# Patient Record
Sex: MALE | URBAN - METROPOLITAN AREA
[De-identification: names, ages, dates, MRNs, and addresses within clinical notes are randomized per-mention and may not be internally consistent; named-entity substitution may affect disease eponyms.]

---

## 2022-08-08 PROBLEM — Z00.00 ENCOUNTER FOR PREVENTIVE HEALTH EXAMINATION: Status: ACTIVE | Noted: 2022-08-08

## 2022-08-24 ENCOUNTER — INPATIENT (INPATIENT)
Facility: HOSPITAL | Age: 62
LOS: 0 days | Discharge: HOME | End: 2022-08-25
Attending: PSYCHIATRY & NEUROLOGY | Admitting: PSYCHIATRY & NEUROLOGY

## 2022-08-24 ENCOUNTER — TRANSCRIPTION ENCOUNTER (OUTPATIENT)
Age: 62
End: 2022-08-24

## 2022-08-24 VITALS
HEART RATE: 68 BPM | OXYGEN SATURATION: 98 % | DIASTOLIC BLOOD PRESSURE: 82 MMHG | RESPIRATION RATE: 19 BRPM | WEIGHT: 240.08 LBS | SYSTOLIC BLOOD PRESSURE: 154 MMHG | TEMPERATURE: 98 F

## 2022-08-24 LAB
ALBUMIN SERPL ELPH-MCNC: 4.4 G/DL — SIGNIFICANT CHANGE UP (ref 3.5–5.2)
ALP SERPL-CCNC: 65 U/L — SIGNIFICANT CHANGE UP (ref 30–115)
ALT FLD-CCNC: 9 U/L — SIGNIFICANT CHANGE UP (ref 0–41)
ANION GAP SERPL CALC-SCNC: 11 MMOL/L — SIGNIFICANT CHANGE UP (ref 7–14)
APTT BLD: 30 SEC — SIGNIFICANT CHANGE UP (ref 27–39.2)
AST SERPL-CCNC: 11 U/L — SIGNIFICANT CHANGE UP (ref 0–41)
BASOPHILS # BLD AUTO: 0.04 K/UL — SIGNIFICANT CHANGE UP (ref 0–0.2)
BASOPHILS NFR BLD AUTO: 0.4 % — SIGNIFICANT CHANGE UP (ref 0–1)
BILIRUB SERPL-MCNC: 0.3 MG/DL — SIGNIFICANT CHANGE UP (ref 0.2–1.2)
BUN SERPL-MCNC: 29 MG/DL — HIGH (ref 10–20)
CALCIUM SERPL-MCNC: 9.5 MG/DL — SIGNIFICANT CHANGE UP (ref 8.5–10.1)
CHLORIDE SERPL-SCNC: 107 MMOL/L — SIGNIFICANT CHANGE UP (ref 98–110)
CO2 SERPL-SCNC: 26 MMOL/L — SIGNIFICANT CHANGE UP (ref 17–32)
CREAT SERPL-MCNC: 1.1 MG/DL — SIGNIFICANT CHANGE UP (ref 0.7–1.5)
EGFR: 76 ML/MIN/1.73M2 — SIGNIFICANT CHANGE UP
EOSINOPHIL # BLD AUTO: 0.09 K/UL — SIGNIFICANT CHANGE UP (ref 0–0.7)
EOSINOPHIL NFR BLD AUTO: 0.9 % — SIGNIFICANT CHANGE UP (ref 0–8)
GLUCOSE SERPL-MCNC: 116 MG/DL — HIGH (ref 70–99)
HCT VFR BLD CALC: 46.1 % — SIGNIFICANT CHANGE UP (ref 42–52)
HGB BLD-MCNC: 15.1 G/DL — SIGNIFICANT CHANGE UP (ref 14–18)
IMM GRANULOCYTES NFR BLD AUTO: 0.4 % — HIGH (ref 0.1–0.3)
INR BLD: 0.96 RATIO — SIGNIFICANT CHANGE UP (ref 0.65–1.3)
LYMPHOCYTES # BLD AUTO: 1.23 K/UL — SIGNIFICANT CHANGE UP (ref 1.2–3.4)
LYMPHOCYTES # BLD AUTO: 12.3 % — LOW (ref 20.5–51.1)
MCHC RBC-ENTMCNC: 25.8 PG — LOW (ref 27–31)
MCHC RBC-ENTMCNC: 32.8 G/DL — SIGNIFICANT CHANGE UP (ref 32–37)
MCV RBC AUTO: 78.8 FL — LOW (ref 80–94)
MONOCYTES # BLD AUTO: 0.52 K/UL — SIGNIFICANT CHANGE UP (ref 0.1–0.6)
MONOCYTES NFR BLD AUTO: 5.2 % — SIGNIFICANT CHANGE UP (ref 1.7–9.3)
NEUTROPHILS # BLD AUTO: 8.08 K/UL — HIGH (ref 1.4–6.5)
NEUTROPHILS NFR BLD AUTO: 80.8 % — HIGH (ref 42.2–75.2)
NRBC # BLD: 0 /100 WBCS — SIGNIFICANT CHANGE UP (ref 0–0)
PLATELET # BLD AUTO: 261 K/UL — SIGNIFICANT CHANGE UP (ref 130–400)
POTASSIUM SERPL-MCNC: 4.3 MMOL/L — SIGNIFICANT CHANGE UP (ref 3.5–5)
POTASSIUM SERPL-SCNC: 4.3 MMOL/L — SIGNIFICANT CHANGE UP (ref 3.5–5)
PROT SERPL-MCNC: 6.4 G/DL — SIGNIFICANT CHANGE UP (ref 6–8)
PROTHROM AB SERPL-ACNC: 11.1 SEC — SIGNIFICANT CHANGE UP (ref 9.95–12.87)
RBC # BLD: 5.85 M/UL — SIGNIFICANT CHANGE UP (ref 4.7–6.1)
RBC # FLD: 15.9 % — HIGH (ref 11.5–14.5)
SARS-COV-2 RNA SPEC QL NAA+PROBE: SIGNIFICANT CHANGE UP
SODIUM SERPL-SCNC: 144 MMOL/L — SIGNIFICANT CHANGE UP (ref 135–146)
T4 AB SER-ACNC: 8.6 UG/DL — SIGNIFICANT CHANGE UP (ref 4.6–12)
TROPONIN T SERPL-MCNC: <0.01 NG/ML — SIGNIFICANT CHANGE UP
TSH SERPL-MCNC: 0.36 UIU/ML — SIGNIFICANT CHANGE UP (ref 0.27–4.2)
WBC # BLD: 10 K/UL — SIGNIFICANT CHANGE UP (ref 4.8–10.8)
WBC # FLD AUTO: 10 K/UL — SIGNIFICANT CHANGE UP (ref 4.8–10.8)

## 2022-08-24 PROCEDURE — 71045 X-RAY EXAM CHEST 1 VIEW: CPT | Mod: 26

## 2022-08-24 PROCEDURE — 70551 MRI BRAIN STEM W/O DYE: CPT | Mod: 26

## 2022-08-24 PROCEDURE — 99291 CRITICAL CARE FIRST HOUR: CPT

## 2022-08-24 PROCEDURE — 93010 ELECTROCARDIOGRAM REPORT: CPT

## 2022-08-24 PROCEDURE — 0042T: CPT | Mod: MA

## 2022-08-24 PROCEDURE — 70498 CT ANGIOGRAPHY NECK: CPT | Mod: 26,MA

## 2022-08-24 PROCEDURE — 70450 CT HEAD/BRAIN W/O DYE: CPT | Mod: 26,MA,59

## 2022-08-24 PROCEDURE — 70496 CT ANGIOGRAPHY HEAD: CPT | Mod: 26,MA

## 2022-08-24 RX ORDER — ALPRAZOLAM 0.25 MG
1 TABLET ORAL ONCE
Refills: 0 | Status: DISCONTINUED | OUTPATIENT
Start: 2022-08-24 | End: 2022-08-24

## 2022-08-24 RX ORDER — ONDANSETRON 8 MG/1
4 TABLET, FILM COATED ORAL ONCE
Refills: 0 | Status: COMPLETED | OUTPATIENT
Start: 2022-08-24 | End: 2022-08-24

## 2022-08-24 RX ORDER — ENOXAPARIN SODIUM 100 MG/ML
40 INJECTION SUBCUTANEOUS EVERY 24 HOURS
Refills: 0 | Status: DISCONTINUED | OUTPATIENT
Start: 2022-08-24 | End: 2022-08-25

## 2022-08-24 RX ORDER — CLOPIDOGREL BISULFATE 75 MG/1
75 TABLET, FILM COATED ORAL ONCE
Refills: 0 | Status: COMPLETED | OUTPATIENT
Start: 2022-08-24 | End: 2022-08-24

## 2022-08-24 RX ORDER — MECLIZINE HCL 12.5 MG
50 TABLET ORAL ONCE
Refills: 0 | Status: COMPLETED | OUTPATIENT
Start: 2022-08-24 | End: 2022-08-24

## 2022-08-24 RX ORDER — ASPIRIN/CALCIUM CARB/MAGNESIUM 324 MG
81 TABLET ORAL DAILY
Refills: 0 | Status: DISCONTINUED | OUTPATIENT
Start: 2022-08-24 | End: 2022-08-25

## 2022-08-24 RX ORDER — ATORVASTATIN CALCIUM 80 MG/1
80 TABLET, FILM COATED ORAL AT BEDTIME
Refills: 0 | Status: DISCONTINUED | OUTPATIENT
Start: 2022-08-24 | End: 2022-08-25

## 2022-08-24 RX ORDER — CLOPIDOGREL BISULFATE 75 MG/1
75 TABLET, FILM COATED ORAL ONCE
Refills: 0 | Status: COMPLETED | OUTPATIENT
Start: 2022-08-25 | End: 2022-08-24

## 2022-08-24 RX ADMIN — CLOPIDOGREL BISULFATE 75 MILLIGRAM(S): 75 TABLET, FILM COATED ORAL at 17:10

## 2022-08-24 RX ADMIN — Medication 1 MILLIGRAM(S): at 18:23

## 2022-08-24 RX ADMIN — ONDANSETRON 4 MILLIGRAM(S): 8 TABLET, FILM COATED ORAL at 14:31

## 2022-08-24 RX ADMIN — Medication 50 MILLIGRAM(S): at 14:31

## 2022-08-24 RX ADMIN — ATORVASTATIN CALCIUM 80 MILLIGRAM(S): 80 TABLET, FILM COATED ORAL at 21:43

## 2022-08-24 RX ADMIN — ENOXAPARIN SODIUM 40 MILLIGRAM(S): 100 INJECTION SUBCUTANEOUS at 21:43

## 2022-08-24 RX ADMIN — Medication 81 MILLIGRAM(S): at 17:22

## 2022-08-24 RX ADMIN — CLOPIDOGREL BISULFATE 75 MILLIGRAM(S): 75 TABLET, FILM COATED ORAL at 23:21

## 2022-08-24 NOTE — ED PROVIDER NOTE - OBJECTIVE STATEMENT
62 year old male, past medical history hyperthyroidism, who presents for eval. patient with gradual worsening of lightheadedness, nausea, b/l blurry vision and weakness that began x2 hours PTA. denies headache, neck pain, chest pain, shortness of breath, hemoptysis, abd pain, diarrhea, urinary symptoms. 62 year old male, past medical history hyperthyroidism on methimazole, who presents with new onset of dizziness/weakness/unsteady gait. patient with dizziness described as room spinning, with lightheadedness and blurry vision. denies fever, chills, headache, chest pain, shortness of breath, abd pain, vomiting/diarrhea, back pain, rash. no recent falls. no hx similar.

## 2022-08-24 NOTE — PATIENT PROFILE ADULT - NSPROSPHOSPCHAPLAINYN_GEN_A_NUR
Chief Complaint   Patient presents with   [facility] ED Follow-up     [facility] DAISY- leg bruising and pain  - left leg       1. Have you been to the ER, urgent care clinic since your last visit? Hospitalized since your last visit? Yes When: 06/28/2021 [facility] Reason for visit: leg bruise     2. Have you seen or consulted any other health care providers outside of the [address] [place] [address] since your last visit? Include any pap smears or colon screening.  No no

## 2022-08-24 NOTE — ED PROVIDER NOTE - CRITICAL CARE ATTENDING CONTRIBUTION TO CARE
62-year-old male with a past medical history significant for hypothyroidism who presents with dizziness.  Patient states that he was fishing and states that he developed bilateral upper extremity tingling and dizziness.  Patient denies any chest pain shortness of breath or any other medical complaints.  Patient denies any other complaints no numbness.    VITAL SIGNS: I have reviewed nursing notes and confirm.  CONSTITUTIONAL: non-toxic, well appearing  SKIN: no rash, no petechiae.  EYES: EOMI, pink conjunctiva, anicteric  ENT: tongue midline, no exudates, MMM  NECK: Supple; no meningismus, no JVD  CARD: RRR, no murmurs, equal radial pulses bilaterally 2+  RESP: CTAB, no respiratory distress  ABD: Soft, non-tender, non-distended, no peritoneal signs, no HSM, no CVA tenderness  EXT: Normal ROM x4. No edema. No calves tenderness  NEURO: Alert, oriented x3. CN2-12 intact, equal strength bilaterally    a/p  62 yr old m that presents with dizziness, pt called as a stroke code  -labs  -ekg  -imaging  -neuro at bedside  -admit to the stroke unit

## 2022-08-24 NOTE — ED ADULT NURSE NOTE - OBJECTIVE STATEMENT
Patient : Jose Maria Camacho Age: 77 year old Sex: male   MRN: [id number] Encounter Date: 5/17/2020      History     Chief Complaint   Patient presents with   • Headache Recurrent or Known DX Migraines     HPI  77-year-old male with past medical history of atrial fibrillation on Coumadin, sleep apnea, diabetes, CAD, hyperlipidemia, obesity, history of recurrent headaches, hypertension, CAD presenting from home for evaluation of recurrent headaches.  Patient has been having frequent headaches and has been seen multiple times in the ED for similar symptoms.  Was last seen yesterday for this headache.  States that headache seems to progressively worsen throughout the day and is most worse in the evenings and at night.  Patient states he is unable to sleep because of his headaches.  Describes headache as a pressure like sensation that is nonradiating but located throughout entire head.  Patient states that he took Norco around 2 AM this morning and again around 5 AM with minimal relief to symptoms.  However, headache has now resolved.  Patient denies any vision changes, numbness, paresthesias, focal weakness, neck pain.  Does not describe a thunderclap headache.  No history of recent head injury or trauma.  Patient states that he probably drinks around 3 cups of caffeine daily which he has been doing for a long time.  Patient lives with his wife who does not present with similar headaches.  Patient does have follow-up with neurology on Monday but headache got so severe yesterday evening that he needed to come back to the ED.  Patient denies chest pain, shortness of breath, cough, dysuria, fever/chills, dizziness, weakness, numbness, syncope, abdominal pain, nausea, vomiting, diarrhea, back pain, neck pain, BLE swelling, or any other sx at this time.  Allergies   Allergen Reactions   • Spironolactone Other (See Comments)     9/21/2018 gynecomastia       Current Discharge Medication List      Prior to Admission Medications     Details   insulin glargine (LANTUS) 100 UNIT/ML vial solution INJECT 40 UNITS SUBCUTANEOUSLY AT BEDTIME  Qty: 20 mL, Refills: 3      insulin aspart (NOVOLOG FLEXPEN) 100 UNIT/ML pen-injector  14 units, 111-150 16 units, 151-190 18 units, 191-230 20 units, 231-270 22 units, 271-310 24 units  Qty: 30 mL, Refills: 3      hyoscyamine (LEVSIN SL) 0.125 MG sublingual tablet Place 1 tablet under the tongue every 4 hours as needed for Cramping. Place one tablet under the tongue before sleep and after a bowel movement.  Qty: 60 tablet, Refills: 3      MM PEN NEEDLES 31G X 8 MM Misc USE 1  SUBCUTANEOUSLY THREE TIMES DAILY  Qty: 300 each, Refills: 0      ciprofloxacin-dexamethasone (CIPRODEX) otic suspension Place 4 drops into left ear 2 times daily.  Qty: 7.5 mL, Refills: 0      nortriptyline (PAMELOR) 50 MG capsule TAKE 1 CAPSULE BY MOUTH EVERY DAY AT BEDTIME  Refills: 1      bumetanide (BUMEX) 2 MG tablet Take 1 tablet by mouth 2 times daily.  Qty: 180 tablet, Refills: 3      tamsulosin (FLOMAX) 0.4 MG Cap Take 1 capsule by mouth daily after a meal.  Qty: 90 capsule, Refills: 0      warfarin (COUMADIN) 5 MG tablet TAKE AS DIRECTED  Qty: 90 tablet, Refills: 1      Alcohol Swabs (ALCOHOL PADS) 70 % Pads       COMFORT EZ PEN NEEDLES 33G X 4 MM Misc       amLODIPine (NORVASC) 10 MG tablet Take 1 tablet by mouth.      Blood Glucose Monitoring Suppl (ACCU-CHEK GOVIND PLUS) w/Device Kit TEST 4 TIMES A DAY  Refills: 0      calcium carbonate (OS-DARRON) 1250 (500 Ca) MG chewable tablet Chew 1 tablet by mouth.      ACCU-CHEK GOVIND PLUS test strip TEST 4 TIMES A DAY  Refills: 2      AQUALANCE LANCETS 30G Misc       nitroGLYcerin (NITROSTAT) 0.4 MG sublingual tablet Place 1 tablet under the tongue.      carvedilol (COREG) 6.25 MG tablet TAKE 1 TABLET BY MOUTH TWICE DAILY  Qty: 60 tablet, Refills: 3      lidocaine (XYLOCAINE) 5 % ointment APPLY TWO (2) TO THREE (3) GRAMS TO AFFECTED AREA(S) THREE (3) TO FOUR (4) TIMES DAILY  Qty:  708.8 Tube, Refills: 5      atorvastatin (LIPITOR) 40 MG tablet TAKE 1 TABLET BY MOUTH ONCE DAILY  Qty: 90 tablet, Refills: 1      Insulin Pen Needle (B-D U/F PEN NEEDLE) 31G X 5 MM Misc Use to inject insulin 4 times daily. Remove needle cover(s) to expose needle before injecting.  Qty: 3 Package, Refills: 3      pantoprazole (PROTONIX) 40 MG tablet Take 1 tablet by mouth daily.  Qty: 30 tablet, Refills: 0      traMADol (ULTRAM) 50 MG tablet Take 1 tablet by mouth every 6 hours as needed for Pain.  Qty: 30 tablet, Refills: 1      potassium CHLORIDE (KLOR-CON M) 20 MEQ jun ER tablet TAKE 1 TABLET BY MOUTH ONCE DAILY  Qty: 90 tablet, Refills: 3      gabapentin (NEURONTIN) 300 MG capsule TAKE ONE CAPSULE BY MOUTH EVERY 8 HOURS  Qty: 90 capsule, Refills: 5      LORazepam (ATIVAN) 0.5 MG tablet Take 1 tablet by mouth daily.  Qty: 30 tablet, Refills: 0      ALPRAZolam (XANAX) 0.5 MG tablet Take 0.5 mg by mouth.      aspirin (ASPIRIN LOW DOSE) 81 MG chewable tablet TAKE 1 TABLET DAILY      Insulin Syringe-Needle U-100 (B-D INSULIN SYRINGE) 29G X 1/2\" 0.5 ML Misc USE AS DIRECTED      Omega-3 Fatty Acids (FISH OIL) 1000 MG capsule       lisinopril (ZESTRIL) 2.5 MG tablet Take by mouth daily.             Current Discharge Medication List          PMH: As per HPI    Sugical Hx: None pertinent    Fhx: None pertinent    Social Hx: None pertinent    Review of Systems  ROS: Ten point ROS  (Skin, constitutional,Eyes, ENT, CV, Pulm, GI, , Neuro, MSK) Neg except per HPI    Skin:  No rashes, no bruising    Constitutional:  No generalized weakness, no malaise    Eyes:  No eye pain, no discharge    ENT:  No sore throat, no runny nose    CV:  No chest pain, no palpitations    Pulm:  No shortness of breath, no cough    GI:  No abdominal pain, no vomiting, no diarrhea    :  No dysuria, no hematuria    Neuro:  No focal weakness, no confusion (+) headache    MSK:  No myalgias, no swelling        Physical Exam     ED Triage Vitals  [05/17/20 0532]   ED Triage Vitals Group      Temp 95.5 °F (35.3 °C)      Heart Rate 81      Resp 20      BP (!) 154/72      SpO2 97 %      EtCO2 mmHg       Height       Weight 298 lb (135.2 kg)      Weight Scale Used ED Stated      BMI (Calculated)       IBW/kg (Calculated)        Physical Exam      PHYSICAL:   Gen: WDWN, in NAD, obese  Skin:  Warm, dry.    Head:  Normocephalic, atraumatic.    Neck:  Supple, no tenderness.    Eye:  Extraocular movements are intact, normal conjunctiva.    Ears, nose, mouth and throat:  Oral mucosa moist, no pharyngeal erythema or exudate.    Cardiovascular:  Regular rate, regular rhythm, No murmur,   Arterial pulses: Bilateral radial  2+  Capillary refill: Bilateral upper extremity < 2 seconds.    Respiratory:  Lungs are clear to auscultation, respirations are non-labored, breath sounds are equal.    Gastrointestinal:  Soft, No tenderness to palpation, Non distended     Back: No midline Tenderness, no bony stepoffs,   Musculoskeletal:  No tenderness, no swelling, no deformity.    Neurological: EOMI, PERRL, symmetric smile, symmetric eye brow raise, tongue midline, facial sensation intact bilaterally. 5/5 strength in the UE and LE bilaterally. 5/5  strength, flexion, and extension in the UE bilaterally. Sensation intact in the upper and lower extremities bilaterally as well. Finger to nose intact, heel to shin within normal limits, rapid alternating movements intact, normal gait. Following commands.  Psychiatric:  Cooperative, appropriate mood & affect, normal judgment.         Lab Results     No results found for this visit on 05/17/20.    EKG Results       Radiology Results     Imaging Results    None         ED Medication Orders (From admission, onward)    Ordered Start     Status Ordering Provider    05/17/20 0636 05/17/20 0645  LORazepam (ATIVAN) injection 0.5 mg  ONCE      Last MAR action:  Given ANNETTA DUKE  77-year-old male presenting for evaluation  of recurrent headaches.  Patient was seen and evaluated in the ED yesterday for similar symptoms.  Nontoxic-appearing.  Afebrile.  Satting well on room air.  Patient is neurovascularly intact with no deficits.  We will repeat head imaging and consult neurology.  ED Course       ED Course as of May 17 1153   Others' Documentation   Sun May 17, 2020   1015 The patient remains well-appearing, he is neurologically intact with stable kidney function.  Pain resolved prior to MRI.    [TC]      ED Course User Index  [TC] Agapito Quezada MD     Patient back from MRI.  MRI negative.  Patient is currently asymptomatic and without headaches.  Noted to be ambulating in room without difficulty.  Steady gait.  Remains neurovascularly intact with no deficits.  Patient is wishing to be discharged home with follow-up with neurology.  Discussed care plan with patient's daughter over the phone who is agreeable to care plan.  Will contact neurology office tomorrow to schedule an appointment.  Patient offered admission for observation as this is his third visit to the ER for similar complaints.  Patient does not want to be admitted to the hospital and wishes to go home.  Patient is not tachycardic, hemodynamically stable, afebrile, well appearing, nontoxic. Will discharge with recs to f/u with pcp and neurology. Return precautions were given including instructions to return for shortness of breath, chest pain, fever, increasing respiratory distress, intractable nausea and vomiting, or any other concerning symptom. These instructions were provided to the patient who expressed understanding and there were no barriers to communication.  Procedures    Clinical Impression     No diagnosis found.    Disposition        There is no disposition no dispo time  There is no comment                     Luis Pineda DO  Resident  05/17/20 8334     Pt states he became dizzy and diaphoretic suddenly 2 hours prior to arrival. denies additional symptoms.  GCS 15. stroke code called

## 2022-08-24 NOTE — ED PROVIDER NOTE - CLINICAL SUMMARY MEDICAL DECISION MAKING FREE TEXT BOX
62 yr old m that presents with dizziness, pt called as a stroke code  -labs  -ekg  -imaging  -neuro at bedside  -admit to the stroke unit

## 2022-08-24 NOTE — ED PROVIDER NOTE - NSICDXNOFAMILYHX_GEN_ALL_ED
Normal vision: sees adequately in most situations; can see medication labels, newsprint <-- Click to add NO pertinent Family History

## 2022-08-24 NOTE — ED ADULT TRIAGE NOTE - CHIEF COMPLAINT QUOTE
Pt came c/o sudden onset of dizziness, diaphoresis, blurry vison, heart burn, tingling in both hands started 2 hours ago.

## 2022-08-24 NOTE — PATIENT PROFILE ADULT - NSPROPTRIGHTBILLOFRIGHTS_GEN_A_NUR
patient Stelara Counseling:  I discussed with the patient the risks of ustekinumab including but not limited to immunosuppression, malignancy, posterior leukoencephalopathy syndrome, and serious infections.  The patient understands that monitoring is required including a PPD at baseline and must alert us or the primary physician if symptoms of infection or other concerning signs are noted.

## 2022-08-24 NOTE — H&P ADULT - NSHPLABSRESULTS_GEN_ALL_CORE
< from: CT Brain Stroke Protocol (08.24.22 @ 14:15) >    No acute intracranial pathology.    No evidence of acute intracranial hemorrhage, midline shift, or mass   effect.    Nonspecific subcentimeter nodule within the right frontal horn of the   lateralventricle which can be further evaluated with contrast-enhanced   MRI of the brain if not clinically contraindicated.      < end of copied text >      LABS:  cret                        15.1   10.00 )-----------( 261      ( 24 Aug 2022 14:34 )             46.1     08-24    144  |  107  |  29<H>  ----------------------------<  116<H>  4.3   |  26  |  1.1    Ca    9.5      24 Aug 2022 14:34    TPro  6.4  /  Alb  4.4  /  TBili  0.3  /  DBili  x   /  AST  11  /  ALT  9   /  AlkPhos  65  08-24    PT/INR - ( 24 Aug 2022 14:34 )   PT: 11.10 sec;   INR: 0.96 ratio         PTT - ( 24 Aug 2022 14:34 )  PTT:30.0 sec

## 2022-08-24 NOTE — ED PROVIDER NOTE - PHYSICAL EXAMINATION
CONSTITUTIONAL: pale appearing male, no acute distress  SKIN: skin exam is warm and dry  HEAD: Normocephalic; atraumatic.  EYES: PERRL, EOM intact; conjunctiva and sclera clear.  ENT: MMM  NECK: No nuchal rigidity.   CARD: S1, S2 normal, no murmur  RESP: No wheezes, rales or rhonchi. Good air movement bilaterally  ABD: soft; non-distended; non-tender.   EXT: Normal ROM.    NEURO: awake, alert, following commands, oriented, grossly unremarkable. No Focal deficits. GCS 15. NIH 0.   PSYCH: Cooperative, appropriate.

## 2022-08-24 NOTE — CONSULT NOTE ADULT - SUBJECTIVE AND OBJECTIVE BOX
HPI:  This 62y old male has PMH of hyperthyroidism on Methimazole, presented with new onset vertigo and imbalance. The patient states that he was fishing when suddenly felt not normal and start to feel spinning sensation associated with sweating, and nausea. He went to his car and felt his gait was wobbly After arriving home, his symptoms did not improve and he decided to come to the hospital.   Pt denied having headache, neck pain, any recent head or neck trauma, slurred speech, blurry vision, weakness, or numbness.   CTH was not significant, CTP showed no hypoperfusion areas, CTA showed possible atherosclerotic vasculature in the vertebral arteries and PCA bilaterally (P1). The patient symptoms could be related to a TIA or a small stroke in the posterior circulation, but it also could be to heat shock/dehydration as he was fishing in the hot weather      PAST MEDICAL & SURGICAL HISTORY:  Hyperthyroidism      No significant past surgical history          Hospital Course:    TODAY'S SUBJECTIVE & REVIEW OF SYMPTOMS:     Constitutional WNL   Cardio WNL   Resp WNL   GI WNL  Heme WNL  Endo WNL  Skin WNL  MSK WNL  Neuro vertigo  Cognitive WNL  Psych WNL      MEDICATIONS  (STANDING):  aspirin enteric coated 81 milliGRAM(s) Oral daily  atorvastatin 80 milliGRAM(s) Oral at bedtime  clopidogrel Tablet 75 milliGRAM(s) Oral Once  enoxaparin Injectable 40 milliGRAM(s) SubCutaneous every 24 hours    MEDICATIONS  (PRN):      FAMILY HISTORY:  No pertinent family history in first degree relatives        Allergies    No Known Allergies    Intolerances        SOCIAL HISTORY:    [  ] Etoh  [  ] Smoking  [  ] Substance abuse     Home Environment:  [   ] Home Alone  [ x  ] Lives with Family  [   ] Home Health Aid    Dwelling:  [   ] Apartment  [ x  ] Private House  [   ] Adult Home  [   ] Skilled Nursing Facility      [   ] Short Term  [   ] Long Term  [x   ] Stairs       Elevator [   ]    FUNCTIONAL STATUS PTA: (Check all that apply)  Ambulation: [  x  ]Independent    [   ] Dependent     [   ] Non-Ambulatory  Assistive Device: [  x ] SA Cane  [   ]  Q Cane  [   ] Walker  [   ]  Wheelchair  ADL : [  x ] Independent  [    ]  Dependent       Vital Signs Last 24 Hrs  T(C): 36.4 (24 Aug 2022 13:39), Max: 36.4 (24 Aug 2022 13:39)  T(F): 97.6 (24 Aug 2022 13:39), Max: 97.6 (24 Aug 2022 13:39)  HR: 68 (24 Aug 2022 13:39) (68 - 68)  BP: 154/82 (24 Aug 2022 13:39) (154/82 - 154/82)  BP(mean): --  RR: 19 (24 Aug 2022 13:39) (19 - 19)  SpO2: 98% (24 Aug 2022 13:39) (98% - 98%)    Parameters below as of 24 Aug 2022 13:39  Patient On (Oxygen Delivery Method): room air          PHYSICAL EXAM: Awake & Alert  GENERAL: NAD  HEAD:  Normocephalic  CHEST/LUNG: Clear   HEART: S1S2+  ABDOMEN: Soft, Nontender  EXTREMITIES:  no calf tenderness    NERVOUS SYSTEM:  Cranial Nerves 2-12 intact [  x ] Abnormal  [   ]  ROM: WFL all extremities [x   ]  Abnormal [   ]  Motor Strength: WFL all extremities  [ x  ]  Abnormal [   ]  Sensation: intact to light touch [ x  ] Abnormal [   ]    FUNCTIONAL STATUS:  Bed Mobility: Independent [   ]  Supervision [  x ]  Needs Assistance [   ]  N/A [   ]  Transfers: Independent [   ]  Supervision [   ]  Needs Assistance [   ]  N/A [   ]   Ambulation: Independent [   ]  Supervision [   ]  Needs Assistance [   ]  N/A [   ]  ADL: Independent [   ] Requires Assistance [   ] N/A [   ]      LABS:                        15.1   10.00 )-----------( 261      ( 24 Aug 2022 14:34 )             46.1     08-24    144  |  107  |  29<H>  ----------------------------<  116<H>  4.3   |  26  |  1.1    Ca    9.5      24 Aug 2022 14:34    TPro  6.4  /  Alb  4.4  /  TBili  0.3  /  DBili  x   /  AST  11  /  ALT  9   /  AlkPhos  65  08-24    PT/INR - ( 24 Aug 2022 14:34 )   PT: 11.10 sec;   INR: 0.96 ratio         PTT - ( 24 Aug 2022 14:34 )  PTT:30.0 sec      RADIOLOGY & ADDITIONAL STUDIES:

## 2022-08-24 NOTE — H&P ADULT - ASSESSMENT
This 62y old male has PMH of hyperthyroidism on Methimazole, presented with new onset vertigo and imbalance. The patient states that he was fishing when suddenly felt not normal and start to feel spinning sensation associated with sweating, and nausea. His exam did not show any focal signs; however, he states that he has vertigo, nausea and abnormal gait still. CTH was not significant, CTP showed no hypoperfusion areas, CTA showed possible atherosclerotic vasculature in the vertebral arteries and PCA bilaterally (P1). The patient symptoms could be related to a TIA or a small stroke in the posterior circulation, but it also could be to heat shock/dehydration as he was fishing in the hot weather    Plan:   - #Stroke workup  - Admit to stroke unit under Dr. South   - continue aspirin 81mg and Plavix 75mg daily  - continue atorvastatin 80mg daily  - q4hr stroke neuro checks and vitals  - obtain MRI Brain without contrast  - Stroke education    Cards  #Blood pressure   - permissive hypertension, Goal -180  - hold home blood pressure medication for now  - obtain TTE with bubble  - Stroke Code EKG Results:    #HLD  - high dose statin as above in CVA  - LDL results: pending     Pulm  - call provider if SPO2 < 94%    GI  #Nutrition/Fluids/Electrolytes   - replete K<4 and Mg <2  - Diet: regular   - IVF: normal saline 75ml/h    Renal  - Daily BMP      Endocrine  # A1C results: pending     #Hyperthyroidism  - TSH results: pending   - Continue home Methimazole     DVT Prophylaxis  - lovenox sq for DVT prophylaxis   - SCDs for DVT prophylaxis            Discussed daily hospital plans and goals with patient.    Discussed with Neurology Attending

## 2022-08-24 NOTE — H&P ADULT - HISTORY OF PRESENT ILLNESS
This 62y old male has PMH of hyperthyroidism on Methimazole, presented with new onset vertigo and imbalance. The patient states that he was fishing when suddenly felt not normal and start to feel spinning sensation associated with sweating, and nausea. He went to his car and felt his gait was wobbly After arriving home, his symptoms did not improve and he decided to come to the hospital.   Pt denied having headache, neck pain, any recent head or neck trauma, slurred speech, blurry vision, weakness, or numbness.

## 2022-08-24 NOTE — ED PROVIDER NOTE - NS ED ROS FT
Review of Systems:  	•	CONSTITUTIONAL: no fever  	•	SKIN: no rash  	•	EYES: no eye pain, +blurry vision  	•	ENT: no sore throat   	•	RESPIRATORY: no shortness of breath  	•	CARDIAC: no chest pain  	•	GI: no abd pain, +nausea, no vomiting, no diarrhea, no constipation  	•	GENITO-URINARY: no discharge, no dysuria; no hematuria, no increased urinary frequency  	•	MUSCULOSKELETAL: no joint paint, no swelling, no redness  	•	NEUROLOGIC: +weakness, +dizziness, no syncope   	•	PSYCH: no anxiety, non suicidal, non homicidal, no hallucination, no depression

## 2022-08-24 NOTE — H&P ADULT - NSHPPHYSICALEXAM_GEN_ALL_CORE
Physical exam:  General: No acute distress, awake and alert      Neurologic:  -Mental status: Awake, alert, oriented to person, place, and time. Speech is fluent with intact naming, repetition, and comprehension, no dysarthria. Recent and remote memory intact. Follows commands. Attention/concentration intact.  -Cranial nerves:   II: Visual fields are full to confrontation.  III, IV, VI: Extraocular movements are intact without nystagmus. Pupils equally round and reactive to light  V:  Facial sensation V1-V3 equal and intact   VII: Face is symmetric with normal eye closure and smile  VIII: Hearing is bilaterally intact to finger rub  IX, X: Uvula is midline and soft palate rises symmetrically  XI: Head turning and shoulder shrug are intact.  XII: Tongue protrudes midline  Motor: Normal bulk and tone. No pronator drift. Strength bilateral upper extremity 5/5, bilateral lower extremities 5/5.  Rapid alternating movements intact and symmetric  Sensation: Intact to light touch bilaterally. No neglect or extinction on double simultaneous testing.  Coordination: No dysmetria on finger-to-nose and heel-to-shin bilaterally  Reflexes: Downgoing toes bilaterally   Gait: limbing because of his Rt hip issues    NIHSS: 0 ASPECT Score: 10

## 2022-08-25 ENCOUNTER — TRANSCRIPTION ENCOUNTER (OUTPATIENT)
Age: 62
End: 2022-08-25

## 2022-08-25 VITALS
DIASTOLIC BLOOD PRESSURE: 82 MMHG | TEMPERATURE: 98 F | HEART RATE: 62 BPM | OXYGEN SATURATION: 99 % | RESPIRATION RATE: 18 BRPM | SYSTOLIC BLOOD PRESSURE: 128 MMHG

## 2022-08-25 LAB
A1C WITH ESTIMATED AVERAGE GLUCOSE RESULT: 5.4 % — SIGNIFICANT CHANGE UP (ref 4–5.6)
ANION GAP SERPL CALC-SCNC: 10 MMOL/L — SIGNIFICANT CHANGE UP (ref 7–14)
BUN SERPL-MCNC: 22 MG/DL — HIGH (ref 10–20)
CALCIUM SERPL-MCNC: 8.9 MG/DL — SIGNIFICANT CHANGE UP (ref 8.5–10.1)
CHLORIDE SERPL-SCNC: 108 MMOL/L — SIGNIFICANT CHANGE UP (ref 98–110)
CHOLEST SERPL-MCNC: 164 MG/DL — SIGNIFICANT CHANGE UP
CO2 SERPL-SCNC: 28 MMOL/L — SIGNIFICANT CHANGE UP (ref 17–32)
CREAT SERPL-MCNC: 0.9 MG/DL — SIGNIFICANT CHANGE UP (ref 0.7–1.5)
CRP SERPL-MCNC: 3.1 MG/L — SIGNIFICANT CHANGE UP
EGFR: 97 ML/MIN/1.73M2 — SIGNIFICANT CHANGE UP
ERYTHROCYTE [SEDIMENTATION RATE] IN BLOOD: 6 MM/HR — SIGNIFICANT CHANGE UP (ref 0–10)
ESTIMATED AVERAGE GLUCOSE: 108 MG/DL — SIGNIFICANT CHANGE UP (ref 68–114)
GLUCOSE SERPL-MCNC: 84 MG/DL — SIGNIFICANT CHANGE UP (ref 70–99)
HCV AB S/CO SERPL IA: 0.04 COI — SIGNIFICANT CHANGE UP
HCV AB SERPL-IMP: SIGNIFICANT CHANGE UP
HDLC SERPL-MCNC: 43 MG/DL — SIGNIFICANT CHANGE UP
LIPID PNL WITH DIRECT LDL SERPL: 99 MG/DL — SIGNIFICANT CHANGE UP
NON HDL CHOLESTEROL: 121 MG/DL — SIGNIFICANT CHANGE UP
POTASSIUM SERPL-MCNC: 4.4 MMOL/L — SIGNIFICANT CHANGE UP (ref 3.5–5)
POTASSIUM SERPL-SCNC: 4.4 MMOL/L — SIGNIFICANT CHANGE UP (ref 3.5–5)
SODIUM SERPL-SCNC: 146 MMOL/L — SIGNIFICANT CHANGE UP (ref 135–146)
T3/T3 UPTAKE INDEX SERPL-RTO: 33 % — SIGNIFICANT CHANGE UP (ref 28–41)
T4/T3 UPTAKE INDEX SERPL: 1.1 TBI — SIGNIFICANT CHANGE UP (ref 0.8–1.3)
TRIGL SERPL-MCNC: 114 MG/DL — SIGNIFICANT CHANGE UP
TSH SERPL-MCNC: 0.35 UIU/ML — SIGNIFICANT CHANGE UP (ref 0.27–4.2)

## 2022-08-25 PROCEDURE — 99221 1ST HOSP IP/OBS SF/LOW 40: CPT

## 2022-08-25 PROCEDURE — 99238 HOSP IP/OBS DSCHRG MGMT 30/<: CPT | Mod: GC

## 2022-08-25 RX ORDER — ONDANSETRON 8 MG/1
4 TABLET, FILM COATED ORAL EVERY 8 HOURS
Refills: 0 | Status: DISCONTINUED | OUTPATIENT
Start: 2022-08-25 | End: 2022-08-25

## 2022-08-25 RX ORDER — SODIUM CHLORIDE 9 MG/ML
500 INJECTION INTRAMUSCULAR; INTRAVENOUS; SUBCUTANEOUS ONCE
Refills: 0 | Status: COMPLETED | OUTPATIENT
Start: 2022-08-25 | End: 2022-08-25

## 2022-08-25 RX ORDER — MECLIZINE HCL 12.5 MG
25 TABLET ORAL
Refills: 0 | Status: DISCONTINUED | OUTPATIENT
Start: 2022-08-25 | End: 2022-08-25

## 2022-08-25 RX ORDER — MECLIZINE HCL 12.5 MG
12.5 TABLET ORAL
Refills: 0 | Status: DISCONTINUED | OUTPATIENT
Start: 2022-08-25 | End: 2022-08-25

## 2022-08-25 RX ORDER — MECLIZINE HCL 12.5 MG
1 TABLET ORAL
Qty: 20 | Refills: 0
Start: 2022-08-25 | End: 2022-09-03

## 2022-08-25 RX ADMIN — Medication 81 MILLIGRAM(S): at 13:03

## 2022-08-25 RX ADMIN — Medication 12.5 MILLIGRAM(S): at 14:29

## 2022-08-25 RX ADMIN — SODIUM CHLORIDE 500 MILLILITER(S): 9 INJECTION INTRAMUSCULAR; INTRAVENOUS; SUBCUTANEOUS at 04:28

## 2022-08-25 NOTE — DISCHARGE NOTE NURSING/CASE MANAGEMENT/SOCIAL WORK - NSDCPEFALRISK_GEN_ALL_CORE
For information on Fall & Injury Prevention, visit: [email or web address] OR  [email or web address] OR  https://www.cdc.gov/steadi/patient.html

## 2022-08-25 NOTE — OCCUPATIONAL THERAPY INITIAL EVALUATION ADULT - PERTINENT HX OF CURRENT PROBLEM, REHAB EVAL
62y old male has PMH of hyperthyroidism on Methimazole, presented with new onset vertigo and imbalance most likely due to BPPV. MRI neg for stroke. 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

## 2022-08-25 NOTE — OCCUPATIONAL THERAPY INITIAL EVALUATION ADULT - REHAB POTENTIAL, OT EVAL
Pt currently functioning at baseline, no c/o dizziness during session, no skilled OT needs at this time pt at baseline d/c OT

## 2022-08-25 NOTE — CONSULT NOTE ADULT - ASSESSMENT
This 62y old male has PMH of hyperthyroidism on Methimazole, presented with new onset vertigo and imbalance. The patient states that he was fishing when suddenly felt not normal and start to feel spinning sensation associated with sweating, and nausea. His exam did not show any focal signs; however, he states that he has vertigo, nausea and abnormal gait still. CTH was not significant, CTP showed no hypoperfusion areas, CTA showed possible atherosclerotic vasculature in the vertebral arteries and PCA bilaterally (P1). The patient symptoms could be related to a TIA or a small stroke in the posterior circulation, but it also could be to heat shock/dehydration as he was fishing in the hot weather    #vertigo/imbalance   - #Stroke workup  - on aspirin 81mg   - on atorvastatin 80mg daily  - MRI Brain negative for acute stroke     #left parotid mass  well circumscribed left parotid mass which can be  further evaluated with contrast-enhanced neck MRI.  needs outpatient work up/follow up with oral & maxillofacial surgeon/head and neck surgery    #HLD  - high dose statin as above     #Hyperthyroidism  - Continue home Methimazole     DVT Prophylaxis  - lovenox sq for DVT prophylaxis   - SCDs for DVT prophylaxis    discharge planning as per neurology    
IMPRESSION: Rehab of r/o CVA vs TIA    PRECAUTIONS: [   ] Cardiac  [   ] Respiratory  [   ] Seizures [   ] Contact Isolation  [   ] Droplet Isolation  [   ] Other    Weight Bearing Status:     RECOMMENDATION:    Out of Bed to Chair     DVT/Decubiti Prophylaxis    REHAB PLAN:     [   x ] Bedside P/T 3-5 times a week   [  x  ]   Bedside O/T  2-3 times a week             [    ] Speech Therapy               [    ]  No Rehab Therapy Indicated   Conditioning/ROM                                    ADL  Bed Mobility                                               Conditioning/ROM  Transfers                                                     Bed Mobility  Sitting /Standing Balance                         Transfers                                        Gait Training                                               Sitting/Standing Balance  Stair Training [   ]Applicable                    Home equipment Eval                                                                        Splinting  [   ] Only      GOALS:   ADL   [  x  ]   Independent                    Transfers  [   x ] Independent                          Ambulation  [   x ] Independent     [  x   ] With device                            [    ]  CG                                                         [    ]  CG                                                                  [    ] CG                            [    ] Min A                                                   [    ] Min A                                                              [    ] Min  A          DISCHARGE PLAN:   [    ]  Good candidate for Intensive Rehabilitation/Hospital based                                             Will tolerate 3hrs Intensive Rehab Daily                                       [     ]  Short Term Rehab in Skilled Nursing Facility                                       [   x  ]  Home with Outpatient or VN services                                         [     ]  Possible Candidate for Intensive Hospital based Rehab

## 2022-08-25 NOTE — PHYSICAL THERAPY INITIAL EVALUATION ADULT - PERTINENT HX OF CURRENT PROBLEM, REHAB EVAL
62y old male has PMH of hyperthyroidism on Methimazole, presented with new onset vertigo and imbalance. The patient states that he was fishing when suddenly felt not normal and start to feel spinning sensation associated with sweating, and nausea. He went to his car and felt his gait was wobbly After arriving home, his symptoms did not improve and he decided to come to the hospital.   Pt denied having headache, neck pain, any recent head or neck trauma, slurred speech, blurry vision, weakness, or numbness.

## 2022-08-25 NOTE — DISCHARGE NOTE PROVIDER - PROVIDER TOKENS
PROVIDER:[TOKEN:[[id number]],FOLLOWUP:[1 month]] PROVIDER:[TOKEN:[[id number]],FOLLOWUP:[1 month]],PROVIDER:[TOKEN:[[id number]],FOLLOWUP:[2 weeks]] PROVIDER:[TOKEN:[[id number]],FOLLOWUP:[1 month]],PROVIDER:[TOKEN:[[id number]],FOLLOWUP:[2 weeks]],PROVIDER:[TOKEN:[[id number]],FOLLOWUP:[1 month]]

## 2022-08-25 NOTE — DISCHARGE NOTE PROVIDER - HOSPITAL COURSE
Hospital course:  This 62y old male has PMH of hyperthyroidism on Methimazole, presented with new onset vertigo and imbalance most likely due to BPPV. MRI neg for stroke. 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    Patient had the following workup done in house:  CT Brain Stroke Protocol   No acute intracranial pathology.  No evidence of acute intracranial hemorrhage, midline shift, or mass effect.    CT Brain Perfusion Maps Stroke   No evidence of perfusion abnormality    CT Angio Brain Stroke Protocol  w/ IV Cont   There is no evidence for saccular aneurysm, vascular malformation, or large vessel occlusion.  No carotid or vertebral artery stenosis.    MR Head No Cont   No acute infarct, intracranial hemorrhage, or midline shift.  Redemonstrated incidental 0.9 cm nodular lesion in the right frontal horn which may reflect a subependymoma. Recommend further characterization with contrast-enhanced MRI.      Physical exam at discharge:  General: In no acute distress, AOx3  HEENT: normocephalic, atraumatic  Lungs: cleared BL to auscultaion  Cards: RRR, no murmurs  ABd: soft, nontender, nondistended    Neurologic:  -Mental status: Awake, alert, oriented to person, place, and time. Speech is fluent with intact naming, repetition, and comprehension, no dysarthria. Recent and remote memory intact. Follows commands. Attention/concentration intact.  -Cranial nerves:   II: Visual fields are full to confrontation.  III, IV, VI: Extraocular movements are intact without nystagmus. Pupils equally round and reactive to light  V:  Facial sensation V1-V3 equal and intact   VII: Face is symmetric with normal eye closure and smile  VIII: Hearing is bilaterally intact to finger rub  IX, X: Uvula is midline and soft palate rises symmetrically  XI: Head turning and shoulder shrug are intact.  XII: Tongue protrudes midline  Motor: Normal bulk and tone. No pronator drift. Strength bilateral upper extremity 5/5, bilateral lower extremities 5/5.  Rapid alternating movements intact and symmetric  Sensation: Intact to light touch bilaterally. No neglect or extinction on double simultaneous testing.  Coordination: No dysmetria on finger-to-nose and heel-to-shin bilaterally  Reflexes: Downgoing toes bilaterally   Gait: Limping on right due to OA in right hip    NIHSS: 0 at dc  New medications on discharge: Meclizine  Follow up: Neurosurgery for MRI w/ contrast

## 2022-08-25 NOTE — SWALLOW BEDSIDE ASSESSMENT ADULT - SLP PERTINENT HISTORY OF CURRENT PROBLEM
61 yo male with PMH of hyperthyroidism. Presented with new onset vertigo and imbalance. The patient states that he was fishing when suddenly felt not normal and start to feel spinning sensation associated with sweating, and nausea. His symptoms did not improve and he decided to come to the hospital. CTH and MRI negative, incidental 0.9 cm nodular lesion in the right frontal horn, may reflect a subependymoma.

## 2022-08-25 NOTE — OCCUPATIONAL THERAPY INITIAL EVALUATION ADULT - GENERAL OBSERVATIONS, REHAB EVAL
Pt received semi ryder in bed in NAD, agreeable to OT evaluation, +tele, +BP cuff, +pulse oxi, left semi ryder in bed at pt's request, RN aware, vitals stable

## 2022-08-25 NOTE — DISCHARGE NOTE PROVIDER - NSDCCPCAREPLAN_GEN_ALL_CORE_FT
PRINCIPAL DISCHARGE DIAGNOSIS  Diagnosis: BPPV (benign paroxysmal positional vertigo)  Assessment and Plan of Treatment: You presented to the hospital for dizziness and difficulty ambulating. While in the hospital, imaging was negative for an acute stroke. Your symptoms were most likely due to benign vertigo. Please take the medication as presribed. You will need to follow up with neurolgy in 2 weeks. If you symptoms persists or worsens, please return to the hospital.      SECONDARY DISCHARGE DIAGNOSES  Diagnosis: Brain ependymoma  Assessment and Plan of Treatment: In the hospital, MRI reveal and incidental finding of a lesion suspicious for ependymoma. Please follow up with neurosurgery outpatient for further evaluation and repeat imaging with IV contrast.

## 2022-08-25 NOTE — CONSULT NOTE ADULT - SUBJECTIVE AND OBJECTIVE BOX
CYNTHIAPER  62y, Male  Allergy: No Known Allergies    Hospital Day: 1d    Patient seen and examined earlier today. along with neurology team     PMH/PSH:  PAST MEDICAL & SURGICAL HISTORY:  Hyperthyroidism          Post traumatic stress disorder (PTSD)      Anxiety and depression      GERD (gastroesophageal reflux disease)      Allergic rhinosinusitis      No significant past surgical history          LAST 24-Hr EVENTS:    VITALS:  T(F): 98.1 (08-25-22 @ 08:00), Max: 98.4 (08-25-22 @ 04:00)  HR: 66 (08-25-22 @ 09:03)  BP: 140/78 (08-25-22 @ 09:03) (97/61 - 154/82)  RR: 17 (08-25-22 @ 08:00)  SpO2: 99% (08-25-22 @ 08:00)    Oxygen Delivery Therapy:   Oxygen Method: Nasal Cannula   LPM: 2   Targeted SpO2 Range (%): 88-97   Notify Provider for SpO2 BELOW: 92   O2 Titration Guideline: Device O2 Percent Range (%): 24-44%, Device O2 Flow Rate Range (LPM): 1-6LPM, O2 Titration Guideline: Increase/Decrease by 1LPM or 4%. Notify provider for any increase in oxygen therapy or inability to achieve targeted SpO2. (08-24-22 @ 13:56)        TESTS & MEASUREMENTS:  Weight/BMI  109.1 (08-24-22 @ 20:00)  33.6 (08-24-22 @ 20:00)    08-24-22 @ 07:01  -  08-25-22 @ 07:00  --------------------------------------------------------  IN: 500 mL / OUT: 0 mL / NET: 500 mL                            15.1   10.00 )-----------( 261      ( 24 Aug 2022 14:34 )             46.1     PT/INR - ( 24 Aug 2022 14:34 )   PT: 11.10 sec;   INR: 0.96 ratio         PTT - ( 24 Aug 2022 14:34 )  PTT:30.0 sec  INR: 0.96 ratio (08-24-22 @ 14:34)    08-25    146  |  108  |  22<H>  ----------------------------<  84  4.4   |  28  |  0.9    Ca    8.9      25 Aug 2022 07:11    TPro  6.4  /  Alb  4.4  /  TBili  0.3  /  DBili  x   /  AST  11  /  ALT  9   /  AlkPhos  65  08-24    LIVER FUNCTIONS - ( 24 Aug 2022 14:34 )  Alb: 4.4 g/dL / Pro: 6.4 g/dL / ALK PHOS: 65 U/L / ALT: 9 U/L / AST: 11 U/L / GGT: x           CARDIAC MARKERS ( 24 Aug 2022 14:34 )  x     / <0.01 ng/mL / x     / x     / x                      COVID-19 PCR: NotDetec (08-24-22 @ 20:07)                RADIOLOGY, ECG, & ADDITIONAL TESTS:  12 Lead ECG:   Ventricular Rate 66 BPM    Atrial Rate 66 BPM    P-R Interval 182 ms    QRS Duration 112 ms    Q-T Interval 394 ms    QTC Calculation(Bazett) 413 ms    P Axis 46 degrees    R Axis -46 degrees    T Axis 14 degrees    Diagnosis Line Normal sinus rhythm  Incomplete right bundle branch block  Left anterior fascicular block  Cannot rule out Inferior infarct (masked by fascicular block?) , age  undetermined  Cannot rule out Anterior infarct , age undetermined  Abnormal ECG    Confirmed by AMANDA COLEMAN MD ([id number]) on 8/24/2022 10:32:51 PM (08-24-22 @ 13:49)      RECENT DIAGNOSTIC ORDERS:  TTE Echo Complete w/o Contrast w/ Doppler:   Transport: Stretcher-Crib  Monitor: w/o Monitor  Addl Info: with bubbles please (08-25-22 @ 10:09)  Diet, Regular:   DASH/TLC Sodium & Cholesterol Restricted  Soft and Bite Sized (SOFTBTSZ)  Low Sodium (08-24-22 @ 18:13)  Hepatitis C Antibody Screen: AM Sched. Collection: 25-Aug-2022 04:30 (08-24-22 @ 15:31)  Thyroid Stimulating Hormone, Serum: AM Sched. Collection: 25-Aug-2022 04:30 (08-24-22 @ 15:13)  A1C with Estimated Average Glucose: AM Sched. Collection: 25-Aug-2022 04:30 (08-24-22 @ 15:13)  Xray Chest 1 View- PORTABLE-Urgent: Urgent   Indication: lightheadedness  Transport: Portable  Exam Completed  Provider's Contact #: [phone] (08-24-22 @ 14:31)      MEDICATIONS:  MEDICATIONS  (STANDING):  aspirin enteric coated 81 milliGRAM(s) Oral daily  atorvastatin 80 milliGRAM(s) Oral at bedtime  enoxaparin Injectable 40 milliGRAM(s) SubCutaneous every 24 hours  meclizine 12.5 milliGRAM(s) Oral two times a day  methimazole 10 milliGRAM(s) Oral daily    MEDICATIONS  (PRN):  ondansetron    Tablet 4 milliGRAM(s) Oral every 8 hours PRN Nausea and/or Vomiting      HOME MEDICATIONS:  methIMAzole 15 mg oral tablet (08-25)      PHYSICAL EXAM:  GENERAL: Patient lying on bed, comfortable   CHEST/LUNG: NVB, no wheezing   HEART: R1+R2, RRR  ABDOMEN: Soft. non tender, BS positive  EXTREMITIES:  no edema, Bruising

## 2022-08-25 NOTE — PROVIDER CONTACT NOTE (OTHER) - RECOMMENDATIONS
bolus ordered 500 cc NS to administer over 1 hr. will re-check B/P 30 min after completion of bolus.

## 2022-08-25 NOTE — DISCHARGE NOTE PROVIDER - CARE PROVIDERS DIRECT ADDRESSES
,[email or web address],DirectAddress_Unknown ,[email or web address],DirectAddress_Unknown,[email or web address]

## 2022-08-25 NOTE — DISCHARGE NOTE PROVIDER - NSDCMRMEDTOKEN_GEN_ALL_CORE_FT
meclizine 12.5 mg oral tablet: Take 1 tab(s) orally 2 times a day for 3 Days, then continue as needed there after.   methIMAzole 15 mg oral tablet: 10  orally once a day

## 2022-08-25 NOTE — DISCHARGE NOTE PROVIDER - CARE PROVIDER_API CALL
Shira Beaulieu)  Neurosurgery  [address], [address]  [place] [address]  Phone: [phone]  Fax: [phone]  Follow Up Time: 1 month   Shira Beaulieu)  Neurosurgery  [address], [address]  [place] [address]  Phone: [phone]  Fax: [phone]  Follow Up Time: 1 month    BRITTNY MCCABE  Internal Medicine  [address] [place] [address]  Phone: ([phone]  Fax: ()-  Follow Up Time: 2 weeks   Shira Beaulieu)  Neurosurgery  [address], [address]  [place] [address]  Phone: [phone]  Fax: [phone]  Follow Up Time: 1 month    BRITTNY MCCABE  Internal Medicine  [address] [place] [address]  Phone: ()-  Fax: ()-  Follow Up Time: 2 weeks    Charly Dhillon)  Neuromuscular Medicine  [address] [place] [address], [address]  [place], [place] [id number]  Phone: [phone]  Fax: [phone]  Follow Up Time: 1 month

## 2022-08-25 NOTE — PHYSICAL THERAPY INITIAL EVALUATION ADULT - GENERAL OBSERVATIONS, REHAB EVAL
[phone] Pt received and left semifowlers in bed, NAD, pt agreeable to PT session +tele +SpO2 +BP cuff

## 2022-08-25 NOTE — PHYSICAL THERAPY INITIAL EVALUATION ADULT - ADDITIONAL COMMENTS
Pt lives with wife in house with no DEREK, no steps inside. Pt independent in ambulation and ADLs, intermittent SC use due to ongoing R hip discomfort.

## 2022-08-25 NOTE — DISCHARGE NOTE NURSING/CASE MANAGEMENT/SOCIAL WORK - PATIENT PORTAL LINK FT
You can access the FollowMyHealth Patient Portal offered by [facility] by registering at the following website: [email or web address]. By joining [employer]’s FollowMyHealth portal, you will also be able to view your health information using other applications (apps) compatible with our system.

## 2022-08-30 DIAGNOSIS — F32.A DEPRESSION, UNSPECIFIED: ICD-10-CM

## 2022-08-30 DIAGNOSIS — E78.5 HYPERLIPIDEMIA, UNSPECIFIED: ICD-10-CM

## 2022-08-30 DIAGNOSIS — K21.9 GASTRO-ESOPHAGEAL REFLUX DISEASE WITHOUT ESOPHAGITIS: ICD-10-CM

## 2022-08-30 DIAGNOSIS — H81.10 BENIGN PAROXYSMAL VERTIGO, UNSPECIFIED EAR: ICD-10-CM

## 2022-08-30 DIAGNOSIS — D43.2 NEOPLASM OF UNCERTAIN BEHAVIOR OF BRAIN, UNSPECIFIED: ICD-10-CM

## 2022-08-30 DIAGNOSIS — K11.8 OTHER DISEASES OF SALIVARY GLANDS: ICD-10-CM

## 2022-08-30 DIAGNOSIS — R42 DIZZINESS AND GIDDINESS: ICD-10-CM

## 2022-08-30 DIAGNOSIS — F43.10 POST-TRAUMATIC STRESS DISORDER, UNSPECIFIED: ICD-10-CM

## 2022-08-30 DIAGNOSIS — F41.9 ANXIETY DISORDER, UNSPECIFIED: ICD-10-CM

## 2022-08-30 DIAGNOSIS — E05.90 THYROTOXICOSIS, UNSPECIFIED WITHOUT THYROTOXIC CRISIS OR STORM: ICD-10-CM

## 2022-09-20 ENCOUNTER — APPOINTMENT (OUTPATIENT)
Dept: NEUROLOGY | Facility: CLINIC | Age: 62
End: 2022-09-20

## 2022-09-20 VITALS
WEIGHT: 240 LBS | OXYGEN SATURATION: 99 % | HEART RATE: 62 BPM | BODY MASS INDEX: 33.6 KG/M2 | HEIGHT: 71 IN | SYSTOLIC BLOOD PRESSURE: 127 MMHG | TEMPERATURE: 97.9 F | DIASTOLIC BLOOD PRESSURE: 82 MMHG

## 2022-09-20 VITALS
DIASTOLIC BLOOD PRESSURE: 78 MMHG | OXYGEN SATURATION: 99 % | HEIGHT: 71 IN | HEART RATE: 58 BPM | TEMPERATURE: 97.9 F | SYSTOLIC BLOOD PRESSURE: 120 MMHG | WEIGHT: 240 LBS | BODY MASS INDEX: 33.6 KG/M2

## 2022-09-20 DIAGNOSIS — F41.9 ANXIETY DISORDER, UNSPECIFIED: ICD-10-CM

## 2022-09-20 DIAGNOSIS — E05.90 THYROTOXICOSIS, UNSPECIFIED W/OUT THYROTOXIC CRISIS OR STORM: ICD-10-CM

## 2022-09-20 DIAGNOSIS — C71.9 MALIGNANT NEOPLASM OF BRAIN, UNSPECIFIED: ICD-10-CM

## 2022-09-20 DIAGNOSIS — R42 DIZZINESS AND GIDDINESS: ICD-10-CM

## 2022-09-20 PROCEDURE — 99215 OFFICE O/P EST HI 40 MIN: CPT

## 2022-09-20 RX ORDER — DIAZEPAM 5 MG/1
5 TABLET ORAL
Qty: 2 | Refills: 0 | Status: ACTIVE | COMMUNITY
Start: 2022-09-20 | End: 1900-01-01

## 2022-09-20 RX ORDER — ATENOLOL 25 MG/1
25 TABLET ORAL
Refills: 0 | Status: ACTIVE | COMMUNITY

## 2022-09-20 RX ORDER — MECLIZINE HYDROCHLORIDE 12.5 MG/1
12.5 TABLET ORAL
Refills: 0 | Status: ACTIVE | COMMUNITY

## 2022-09-20 RX ORDER — METHIMAZOLE 5 MG/1
5 TABLET ORAL 3 TIMES DAILY
Refills: 0 | Status: ACTIVE | COMMUNITY

## 2022-09-20 NOTE — ASSESSMENT
[FreeTextEntry1] : Patient is 63 yo RH man with PMHx of hyperthyroidism on methimazole, anxiety, who presents as HFU from 8/24/22 for dizziness with negative stroke w/u (also no significant vascular RF at that time) and dx at [place] was BPPV, but may also have had component related to overuse of methimazole and anxiety bc he has presyncopal sx. FTN w/ eyes closed, walk heel to toe and saccade were negative today. Orthostats also negative today. Exam significant for LLE 2+, but RLE absent. Has R hip issues. \par \par PLAN: \par -F/u with PCP for incidental parotid mass, knows about this and getting w/u \par -MRI H waw with Valium 5 2TAB for subependymoma (asymptomatic for HA/confusion currently)\par -Script for VT if sx return\par -F/u in 3-4 mo

## 2022-09-20 NOTE — PHYSICAL EXAM
[FreeTextEntry1] : Focal neurological exam:\par \par MS: Awake, alert, oriented to person, place, situation and time. Normal affect. Follows commands. \par \par Language: Speech is clear, fluent with good repetition & comprehension. No dysarthria. \par \par CNs 2 - 12 intact. EOMI no nystagmus, no diplopia. VFF. No facial asymmetry b/l, full eye closure strength b/l. Hearing grossly normal. Head turning & shoulder shrug intact b/l. Tongue midline, normal movements, no atrophy.\par \par Motor: Normal muscle bulk & tone. No noticeable tremor or seizure. No pronator drift. Muscle strength of b/l UE and b/l LE 5/5. \par \par Reflexes: RLE absent and LLE 2+ \par \par Sensation: Intact to LT, temperature and vibration b/l throughout\par \par Cortical: No extinction\par \par Coordination: No dysmetria to FTN.\par \par Gait: No postural instability. Normal stance and tandem gait.\par \par NIHSS 0 \par \par \par \par

## 2022-09-20 NOTE — HISTORY OF PRESENT ILLNESS
[FreeTextEntry1] : Patient is 63 yo RH man with PMHx of hyperthyroidism on methimazole, anxiety, who presents as HFU from 8/24/22 for dizziness with negative stroke w/u and dx at dc was BPPV.  \par \par He was fishing and he developed acute onset b/l UE tingling and dizziness. Admitted to diaphoresis and palpitations. Sx lasted about 30 min but in ED it was resolved. Admits to anxiety.\par \par NIHSS was documented as 0 but mRS was 2 \par \par MRI H nc NEG, but found to have incidental Right frontal horn SUBEPENDYMOMA 0.9 cm \par \par Sent home on Meclizine 12.5 BID x3 days and PRN thereafter \par \par minimal Vascular RF: remote smoker, no on Med diet, no DM, no HTN \par Orthostats negative today\par PTA: NO AP or statin \par \par LDL 99 and A1c 5.4 \par TSH was 0.35 inpatient, low, now on reduced methimazole feeling better \par CTA H/N no significant finding\par \par Still feels 90% back to normal, just sedentary bc of hip pain and chronic insomnia. \par No sx for 1.5 wks\par -------------------------------------------------------------\par Meclizine at the dose prescribed made him very tired, but did help with dizziness. No longer dizzy. No dizziness when turning head quickly either. \par \par \par

## 2022-10-06 ENCOUNTER — APPOINTMENT (OUTPATIENT)
Dept: ENDOCRINOLOGY | Facility: CLINIC | Age: 62
End: 2022-10-06

## 2023-01-24 ENCOUNTER — APPOINTMENT (OUTPATIENT)
Dept: NEUROLOGY | Facility: CLINIC | Age: 63
End: 2023-01-24

## 2023-11-15 RX ORDER — METHIMAZOLE 10 MG/1
10 TABLET ORAL
Qty: 0 | Refills: 0 | DISCHARGE

## 2023-11-15 RX ORDER — METHIMAZOLE 10 MG/1
15 TABLET ORAL
Qty: 0 | Refills: 0 | DISCHARGE